# Patient Record
Sex: MALE | Race: BLACK OR AFRICAN AMERICAN | NOT HISPANIC OR LATINO | RURAL
[De-identification: names, ages, dates, MRNs, and addresses within clinical notes are randomized per-mention and may not be internally consistent; named-entity substitution may affect disease eponyms.]

---

## 2021-09-27 ENCOUNTER — HOSPITAL ENCOUNTER (EMERGENCY)
Facility: HOSPITAL | Age: 56
Discharge: HOME | End: 2021-09-27
Payer: MEDICAID

## 2021-09-27 VITALS
SYSTOLIC BLOOD PRESSURE: 156 MMHG | WEIGHT: 217 LBS | HEIGHT: 71 IN | BODY MASS INDEX: 30.38 KG/M2 | DIASTOLIC BLOOD PRESSURE: 96 MMHG | HEART RATE: 82 BPM | TEMPERATURE: 99 F | OXYGEN SATURATION: 98 % | RESPIRATION RATE: 18 BRPM

## 2021-09-27 DIAGNOSIS — S62.320A CLOSED DISPLACED FRACTURE OF SHAFT OF SECOND METACARPAL BONE OF RIGHT HAND, INITIAL ENCOUNTER: Primary | ICD-10-CM

## 2021-09-27 DIAGNOSIS — S69.90XA HAND INJURY: ICD-10-CM

## 2021-09-27 PROCEDURE — 99283 PR EMERGENCY DEPT VISIT,LEVEL III: ICD-10-PCS | Mod: ,,, | Performed by: NURSE PRACTITIONER

## 2021-09-27 PROCEDURE — 99283 EMERGENCY DEPT VISIT LOW MDM: CPT

## 2021-09-27 PROCEDURE — 99283 EMERGENCY DEPT VISIT LOW MDM: CPT | Mod: ,,, | Performed by: NURSE PRACTITIONER

## 2021-09-29 ENCOUNTER — TELEPHONE (OUTPATIENT)
Dept: EMERGENCY MEDICINE | Facility: HOSPITAL | Age: 56
End: 2021-09-29

## 2021-09-30 ENCOUNTER — TELEPHONE (OUTPATIENT)
Dept: EMERGENCY MEDICINE | Facility: HOSPITAL | Age: 56
End: 2021-09-30

## 2021-10-06 ENCOUNTER — OFFICE VISIT (OUTPATIENT)
Dept: ORTHOPEDICS | Facility: CLINIC | Age: 56
End: 2021-10-06
Payer: MEDICAID

## 2021-10-06 VITALS — HEIGHT: 71 IN | BODY MASS INDEX: 30.38 KG/M2 | WEIGHT: 217 LBS

## 2021-10-06 DIAGNOSIS — S62.341A CLOSED NONDISPLACED FRACTURE OF BASE OF SECOND METACARPAL BONE OF LEFT HAND, INITIAL ENCOUNTER: Primary | ICD-10-CM

## 2021-10-06 PROCEDURE — 99203 OFFICE O/P NEW LOW 30 MIN: CPT | Mod: 57,,, | Performed by: NURSE PRACTITIONER

## 2021-10-06 PROCEDURE — 99203 PR OFFICE/OUTPT VISIT, NEW, LEVL III, 30-44 MIN: ICD-10-PCS | Mod: 57,,, | Performed by: NURSE PRACTITIONER

## 2021-10-06 PROCEDURE — 26600 PR CLOSED RX METACARPAL FX: ICD-10-PCS | Mod: LT,,, | Performed by: NURSE PRACTITIONER

## 2021-10-06 PROCEDURE — 26600 TREAT METACARPAL FRACTURE: CPT | Mod: LT,,, | Performed by: NURSE PRACTITIONER

## 2021-10-13 DIAGNOSIS — M79.642 LEFT HAND PAIN: Primary | ICD-10-CM

## 2021-10-13 DIAGNOSIS — Z47.89 ORTHOPEDIC AFTERCARE: ICD-10-CM

## 2021-10-14 ENCOUNTER — HOSPITAL ENCOUNTER (OUTPATIENT)
Dept: RADIOLOGY | Facility: HOSPITAL | Age: 56
Discharge: HOME | End: 2021-10-14
Attending: ORTHOPAEDIC SURGERY
Payer: MEDICAID

## 2021-10-14 ENCOUNTER — OFFICE VISIT (OUTPATIENT)
Dept: ORTHOPEDICS | Facility: CLINIC | Age: 56
End: 2021-10-14
Payer: MEDICAID

## 2021-10-14 DIAGNOSIS — S62.301D CLOSED NONDISPLACED FRACTURE OF SECOND METACARPAL BONE OF LEFT HAND WITH ROUTINE HEALING, UNSPECIFIED PORTION OF METACARPAL, SUBSEQUENT ENCOUNTER: Primary | ICD-10-CM

## 2021-10-14 DIAGNOSIS — M79.642 LEFT HAND PAIN: ICD-10-CM

## 2021-10-14 DIAGNOSIS — Z47.89 ORTHOPEDIC AFTERCARE: ICD-10-CM

## 2021-10-14 PROCEDURE — 99024 PR POST-OP FOLLOW-UP VISIT: ICD-10-PCS | Mod: ,,, | Performed by: ORTHOPAEDIC SURGERY

## 2021-10-14 PROCEDURE — 99024 POSTOP FOLLOW-UP VISIT: CPT | Mod: ,,, | Performed by: ORTHOPAEDIC SURGERY

## 2021-10-14 PROCEDURE — 73130 X-RAY EXAM OF HAND: CPT | Mod: TC,LT

## 2021-10-14 PROCEDURE — 73130 XR HAND COMPLETE 3 VIEW LEFT: ICD-10-PCS | Mod: 26,LT,, | Performed by: ORTHOPAEDIC SURGERY

## 2021-10-14 PROCEDURE — 73130 X-RAY EXAM OF HAND: CPT | Mod: 26,LT,, | Performed by: ORTHOPAEDIC SURGERY

## 2021-10-25 DIAGNOSIS — M79.642 LEFT HAND PAIN: Primary | ICD-10-CM

## 2021-10-28 ENCOUNTER — HOSPITAL ENCOUNTER (OUTPATIENT)
Dept: RADIOLOGY | Facility: HOSPITAL | Age: 56
Discharge: HOME | End: 2021-10-28
Attending: ORTHOPAEDIC SURGERY
Payer: MEDICAID

## 2021-10-28 ENCOUNTER — OFFICE VISIT (OUTPATIENT)
Dept: ORTHOPEDICS | Facility: CLINIC | Age: 56
End: 2021-10-28
Payer: MEDICAID

## 2021-10-28 DIAGNOSIS — S62.301D CLOSED NONDISPLACED FRACTURE OF SECOND METACARPAL BONE OF LEFT HAND WITH ROUTINE HEALING, UNSPECIFIED PORTION OF METACARPAL, SUBSEQUENT ENCOUNTER: Primary | ICD-10-CM

## 2021-10-28 DIAGNOSIS — M79.642 LEFT HAND PAIN: ICD-10-CM

## 2021-10-28 PROCEDURE — 99024 POSTOP FOLLOW-UP VISIT: CPT | Mod: ,,, | Performed by: ORTHOPAEDIC SURGERY

## 2021-10-28 PROCEDURE — 99024 PR POST-OP FOLLOW-UP VISIT: ICD-10-PCS | Mod: ,,, | Performed by: ORTHOPAEDIC SURGERY

## 2021-10-28 PROCEDURE — 73130 XR HAND COMPLETE 3 VIEW LEFT: ICD-10-PCS | Mod: 26,LT,, | Performed by: ORTHOPAEDIC SURGERY

## 2021-10-28 PROCEDURE — 73130 X-RAY EXAM OF HAND: CPT | Mod: 26,LT,, | Performed by: ORTHOPAEDIC SURGERY

## 2021-10-28 PROCEDURE — 73130 X-RAY EXAM OF HAND: CPT | Mod: TC,LT

## 2021-11-22 DIAGNOSIS — S62.301D CLOSED NONDISPLACED FRACTURE OF SECOND METACARPAL BONE OF LEFT HAND WITH ROUTINE HEALING, UNSPECIFIED PORTION OF METACARPAL, SUBSEQUENT ENCOUNTER: Primary | ICD-10-CM

## 2022-09-29 ENCOUNTER — HOSPITAL ENCOUNTER (EMERGENCY)
Facility: HOSPITAL | Age: 57
Discharge: HOME | End: 2022-09-29
Payer: MEDICAID

## 2022-09-29 VITALS
HEART RATE: 78 BPM | RESPIRATION RATE: 17 BRPM | WEIGHT: 218 LBS | BODY MASS INDEX: 30.52 KG/M2 | HEIGHT: 71 IN | DIASTOLIC BLOOD PRESSURE: 89 MMHG | SYSTOLIC BLOOD PRESSURE: 148 MMHG | TEMPERATURE: 98 F | OXYGEN SATURATION: 99 %

## 2022-09-29 DIAGNOSIS — S39.012A BACK STRAIN, INITIAL ENCOUNTER: Primary | ICD-10-CM

## 2022-09-29 PROCEDURE — 99283 EMERGENCY DEPT VISIT LOW MDM: CPT | Mod: ,,, | Performed by: FAMILY MEDICINE

## 2022-09-29 PROCEDURE — 63600175 PHARM REV CODE 636 W HCPCS: Performed by: FAMILY MEDICINE

## 2022-09-29 PROCEDURE — 99284 EMERGENCY DEPT VISIT MOD MDM: CPT

## 2022-09-29 PROCEDURE — 99283 PR EMERGENCY DEPT VISIT,LEVEL III: ICD-10-PCS | Mod: ,,, | Performed by: FAMILY MEDICINE

## 2022-09-29 PROCEDURE — 96372 THER/PROPH/DIAG INJ SC/IM: CPT

## 2022-09-29 RX ORDER — NAPROXEN 500 MG/1
500 TABLET ORAL 2 TIMES DAILY WITH MEALS
Qty: 60 TABLET | Refills: 0 | Status: SHIPPED | OUTPATIENT
Start: 2022-09-29

## 2022-09-29 RX ORDER — KETOROLAC TROMETHAMINE 30 MG/ML
60 INJECTION, SOLUTION INTRAMUSCULAR; INTRAVENOUS
Status: COMPLETED | OUTPATIENT
Start: 2022-09-29 | End: 2022-09-29

## 2022-09-29 RX ORDER — NAPROXEN 500 MG/1
500 TABLET ORAL 2 TIMES DAILY WITH MEALS
Qty: 60 TABLET | Refills: 0 | OUTPATIENT
Start: 2022-09-29 | End: 2022-09-29 | Stop reason: SDUPTHER

## 2022-09-29 RX ORDER — DEXAMETHASONE SODIUM PHOSPHATE 4 MG/ML
4 INJECTION, SOLUTION INTRA-ARTICULAR; INTRALESIONAL; INTRAMUSCULAR; INTRAVENOUS; SOFT TISSUE
Status: COMPLETED | OUTPATIENT
Start: 2022-09-29 | End: 2022-09-29

## 2022-09-29 RX ORDER — TIZANIDINE 4 MG/1
4 TABLET ORAL EVERY 6 HOURS PRN
Qty: 30 TABLET | Refills: 0 | OUTPATIENT
Start: 2022-09-29 | End: 2022-09-29 | Stop reason: SDUPTHER

## 2022-09-29 RX ORDER — TIZANIDINE 4 MG/1
4 TABLET ORAL EVERY 6 HOURS PRN
Qty: 30 TABLET | Refills: 0 | Status: SHIPPED | OUTPATIENT
Start: 2022-09-29 | End: 2022-10-09

## 2022-09-29 RX ADMIN — KETOROLAC TROMETHAMINE 60 MG: 30 INJECTION, SOLUTION INTRAMUSCULAR at 05:09

## 2022-09-29 RX ADMIN — DEXAMETHASONE SODIUM PHOSPHATE 4 MG: 4 INJECTION, SOLUTION INTRAMUSCULAR; INTRAVENOUS at 05:09

## 2022-09-29 NOTE — ED PROVIDER NOTES
Encounter Date: 9/29/2022       History     Chief Complaint   Patient presents with    Back Pain     Patient with lower back pain starting on left lumbar with radiation to the buttocks.  No falls no trauma    Review of patient's allergies indicates:  No Known Allergies  History reviewed. No pertinent past medical history.  History reviewed. No pertinent surgical history.  History reviewed. No pertinent family history.  Social History     Tobacco Use    Smoking status: Every Day    Smokeless tobacco: Never   Substance Use Topics    Alcohol use: Yes     Alcohol/week: 3.0 standard drinks     Types: 3 Cans of beer per week    Drug use: Never     Review of Systems   Constitutional:  Positive for fatigue. Negative for fever.   HENT: Negative.  Negative for sore throat.    Eyes: Negative.    Respiratory: Negative.  Negative for shortness of breath.    Cardiovascular: Negative.  Negative for chest pain.   Gastrointestinal: Negative.  Negative for nausea.   Endocrine: Negative.    Genitourinary: Negative.  Negative for dysuria.   Musculoskeletal: Negative.  Negative for back pain.   Skin: Negative.  Negative for rash.   Allergic/Immunologic: Negative.    Neurological: Negative.  Negative for weakness.   Hematological: Negative.  Does not bruise/bleed easily.   Psychiatric/Behavioral: Negative.       Physical Exam     Initial Vitals [09/29/22 0448]   BP Pulse Resp Temp SpO2   (!) 154/99 80 17 97.6 °F (36.4 °C) 99 %      MAP       --         Physical Exam    Constitutional: He appears well-developed and well-nourished.   HENT:   Head: Normocephalic and atraumatic.   Right Ear: External ear normal.   Left Ear: External ear normal.   Nose: Nose normal.   Mouth/Throat: Oropharynx is clear and moist.   Eyes: Conjunctivae and EOM are normal. Pupils are equal, round, and reactive to light.   Neck: Neck supple.   Normal range of motion.  Cardiovascular:  Normal rate, regular rhythm, normal heart sounds and intact distal pulses.            Pulmonary/Chest: Breath sounds normal.   Abdominal: Abdomen is soft. Bowel sounds are normal.   Genitourinary:    Prostate and penis normal.     Musculoskeletal:         General: Normal range of motion.      Cervical back: Normal range of motion and neck supple.      Comments: Decreased range of motion for spine     Neurological: He is alert and oriented to person, place, and time. He has normal strength and normal reflexes.   Skin: Skin is warm and dry.   Psychiatric: He has a normal mood and affect. His behavior is normal. Judgment and thought content normal.       Medical Screening Exam   See Full Note    ED Course   Procedures  Labs Reviewed - No data to display       Imaging Results    None          Medications   ketorolac injection 60 mg (has no administration in time range)   dexamethasone injection 4 mg (has no administration in time range)                       Clinical Impression:   Final diagnoses:  [S39.012A] Back strain, initial encounter (Primary)      ED Disposition Condition    Discharge Stable          ED Prescriptions       Medication Sig Dispense Start Date End Date Auth. Provider    tiZANidine (ZANAFLEX) 4 MG tablet  (Status: Discontinued) Take 1 tablet (4 mg total) by mouth every 6 (six) hours as needed. 30 tablet 9/29/2022 9/29/2022 Julio Cesar Hewitt,     naproxen (NAPROSYN) 500 MG tablet  (Status: Discontinued) Take 1 tablet (500 mg total) by mouth 2 (two) times daily with meals. 60 tablet 9/29/2022 9/29/2022 Julio Cesar Hewitt DO    naproxen (NAPROSYN) 500 MG tablet Take 1 tablet (500 mg total) by mouth 2 (two) times daily with meals. 60 tablet 9/29/2022 -- Julio Cesar Hewitt DO    tiZANidine (ZANAFLEX) 4 MG tablet Take 1 tablet (4 mg total) by mouth every 6 (six) hours as needed. 30 tablet 9/29/2022 10/9/2022 Julio Cesar Hewitt DO          Follow-up Information    None          Julio Cesar Hewitt DO  09/29/22 0551

## 2022-09-29 NOTE — ED TRIAGE NOTES
Pt presents to ED with c/o lower back pain to left side of back. Pt states it was hurting a little this afternoon, but was severe upon waking this am. Pt states he took 500mg of tylenol approx 1.5 hrs PTA.

## 2022-09-29 NOTE — ED NOTES
Instructed patient that he may take tylenol 650-1000 mg every 4-6 hours as needed for pain. May also take ibuprofen 400-600 mg every 8 hours as needed for pain. Do not take any ibuprofen for 12-12 hours due to the toradol injection he received here at the ED. May apply ice packs to painful area for 20 minutes every 2 hours for 24 hours, rthen may warm compresses to the painful area for 20 minutes every 2 hours as needed. Follow-up with pcp on Friday if pain no better. Return to ed for any new emergencies. Patient verbalized understanding of all instructions.

## 2023-10-26 ENCOUNTER — TELEPHONE (OUTPATIENT)
Dept: FAMILY MEDICINE | Facility: CLINIC | Age: 58
End: 2023-10-26
Payer: MEDICAID

## 2025-03-19 ENCOUNTER — HOSPITAL ENCOUNTER (EMERGENCY)
Facility: HOSPITAL | Age: 60
Discharge: ANOTHER HOSPITAL | End: 2025-03-20
Payer: MEDICAID

## 2025-03-19 DIAGNOSIS — K86.89 PANCREATIC MASS: Primary | ICD-10-CM

## 2025-03-19 DIAGNOSIS — R16.0 LIVER MASSES: ICD-10-CM

## 2025-03-19 DIAGNOSIS — C79.9 METASTATIC DISEASE: ICD-10-CM

## 2025-03-19 LAB
ALBUMIN SERPL BCP-MCNC: 3.9 G/DL (ref 3.5–5)
ALBUMIN/GLOB SERPL: 0.9 {RATIO}
ALP SERPL-CCNC: 285 U/L (ref 40–150)
ALT SERPL W P-5'-P-CCNC: 72 U/L
ANION GAP SERPL CALCULATED.3IONS-SCNC: 14 MMOL/L (ref 7–16)
AST SERPL W P-5'-P-CCNC: 51 U/L (ref 11–45)
BACTERIA #/AREA URNS HPF: ABNORMAL /HPF
BASOPHILS # BLD AUTO: 0.02 K/UL (ref 0–0.2)
BASOPHILS NFR BLD AUTO: 0.3 % (ref 0–1)
BILIRUB SERPL-MCNC: 0.6 MG/DL
BILIRUB UR QL STRIP: NEGATIVE
BUN SERPL-MCNC: 12 MG/DL (ref 8–26)
BUN/CREAT SERPL: 14 (ref 6–20)
CALCIUM SERPL-MCNC: 10.2 MG/DL (ref 8.4–10.2)
CAOX CRY #/AREA URNS LPF: ABNORMAL /LPF
CHLORIDE SERPL-SCNC: 103 MMOL/L (ref 98–107)
CLARITY UR: CLEAR
CO2 SERPL-SCNC: 28 MMOL/L (ref 22–29)
COLOR UR: YELLOW
CREAT SERPL-MCNC: 0.84 MG/DL (ref 0.72–1.25)
DIFFERENTIAL METHOD BLD: ABNORMAL
EGFR (NO RACE VARIABLE) (RUSH/TITUS): 101 ML/MIN/1.73M2
EOSINOPHIL # BLD AUTO: 0.09 K/UL (ref 0–0.5)
EOSINOPHIL NFR BLD AUTO: 1.2 % (ref 1–4)
ERYTHROCYTE [DISTWIDTH] IN BLOOD BY AUTOMATED COUNT: 14.2 % (ref 11.5–14.5)
GLOBULIN SER-MCNC: 4.4 G/DL (ref 2–4)
GLUCOSE SERPL-MCNC: 166 MG/DL (ref 74–100)
GLUCOSE UR STRIP-MCNC: NEGATIVE MG/DL
HCT VFR BLD AUTO: 36.6 % (ref 40–54)
HGB BLD-MCNC: 12.2 G/DL (ref 13.5–18)
HYALINE CASTS #/AREA URNS LPF: ABNORMAL /LPF
KETONES UR STRIP-SCNC: NEGATIVE MG/DL
LEUKOCYTE ESTERASE UR QL STRIP: ABNORMAL
LIPASE SERPL-CCNC: 70 U/L
LYMPHOCYTES # BLD AUTO: 1.95 K/UL (ref 1–4.8)
LYMPHOCYTES NFR BLD AUTO: 26.2 % (ref 27–41)
MCH RBC QN AUTO: 28.1 PG (ref 27–31)
MCHC RBC AUTO-ENTMCNC: 33.3 G/DL (ref 32–36)
MCV RBC AUTO: 84.3 FL (ref 80–96)
MONOCYTES # BLD AUTO: 0.65 K/UL (ref 0–0.8)
MONOCYTES NFR BLD AUTO: 8.7 % (ref 2–6)
MPC BLD CALC-MCNC: 9.5 FL (ref 9.4–12.4)
MUCOUS THREADS #/AREA URNS HPF: ABNORMAL /HPF
NEUTROPHILS # BLD AUTO: 4.73 K/UL (ref 1.8–7.7)
NEUTROPHILS NFR BLD AUTO: 63.6 % (ref 53–65)
NITRITE UR QL STRIP: NEGATIVE
PH UR STRIP: 6.5 PH UNITS
PLATELET # BLD AUTO: 199 K/UL (ref 150–400)
POTASSIUM SERPL-SCNC: 4 MMOL/L (ref 3.5–5.1)
PROT SERPL-MCNC: 8.3 G/DL (ref 6.4–8.3)
PROT UR QL STRIP: NEGATIVE
RBC # BLD AUTO: 4.34 M/UL (ref 4.6–6.2)
RBC # UR STRIP: NEGATIVE /UL
RBC #/AREA URNS HPF: ABNORMAL /HPF
SODIUM SERPL-SCNC: 141 MMOL/L (ref 136–145)
SP GR UR STRIP: 1.01
SQUAMOUS #/AREA URNS LPF: ABNORMAL /LPF
UROBILINOGEN UR STRIP-ACNC: 1 MG/DL
WBC # BLD AUTO: 7.44 K/UL (ref 4.5–11)
WBC #/AREA URNS HPF: ABNORMAL /HPF
YEAST #/AREA URNS HPF: ABNORMAL /HPF

## 2025-03-19 PROCEDURE — 83690 ASSAY OF LIPASE: CPT | Performed by: NURSE PRACTITIONER

## 2025-03-19 PROCEDURE — 63600175 PHARM REV CODE 636 W HCPCS: Performed by: NURSE PRACTITIONER

## 2025-03-19 PROCEDURE — 25000003 PHARM REV CODE 250: Performed by: NURSE PRACTITIONER

## 2025-03-19 PROCEDURE — 96361 HYDRATE IV INFUSION ADD-ON: CPT

## 2025-03-19 PROCEDURE — 99285 EMERGENCY DEPT VISIT HI MDM: CPT | Mod: 25

## 2025-03-19 PROCEDURE — 96375 TX/PRO/DX INJ NEW DRUG ADDON: CPT

## 2025-03-19 PROCEDURE — 80053 COMPREHEN METABOLIC PANEL: CPT | Performed by: NURSE PRACTITIONER

## 2025-03-19 PROCEDURE — 81003 URINALYSIS AUTO W/O SCOPE: CPT | Performed by: NURSE PRACTITIONER

## 2025-03-19 PROCEDURE — 85025 COMPLETE CBC W/AUTO DIFF WBC: CPT | Performed by: NURSE PRACTITIONER

## 2025-03-19 PROCEDURE — 96374 THER/PROPH/DIAG INJ IV PUSH: CPT

## 2025-03-19 PROCEDURE — 99285 EMERGENCY DEPT VISIT HI MDM: CPT | Mod: ,,, | Performed by: NURSE PRACTITIONER

## 2025-03-19 PROCEDURE — 25500020 PHARM REV CODE 255: Performed by: NURSE PRACTITIONER

## 2025-03-19 PROCEDURE — 87086 URINE CULTURE/COLONY COUNT: CPT | Performed by: NURSE PRACTITIONER

## 2025-03-19 RX ORDER — HYDROMORPHONE HYDROCHLORIDE 2 MG/ML
0.5 INJECTION, SOLUTION INTRAMUSCULAR; INTRAVENOUS; SUBCUTANEOUS
Status: DISCONTINUED | OUTPATIENT
Start: 2025-03-19 | End: 2025-03-20 | Stop reason: HOSPADM

## 2025-03-19 RX ORDER — MORPHINE SULFATE 2 MG/ML
2 INJECTION, SOLUTION INTRAMUSCULAR; INTRAVENOUS
Refills: 0 | Status: COMPLETED | OUTPATIENT
Start: 2025-03-19 | End: 2025-03-19

## 2025-03-19 RX ORDER — ONDANSETRON HYDROCHLORIDE 2 MG/ML
4 INJECTION, SOLUTION INTRAVENOUS
Status: DISCONTINUED | OUTPATIENT
Start: 2025-03-19 | End: 2025-03-19

## 2025-03-19 RX ORDER — OXYCODONE HYDROCHLORIDE 5 MG/1
5 TABLET ORAL EVERY 4 HOURS PRN
Refills: 0 | Status: DISCONTINUED | OUTPATIENT
Start: 2025-03-19 | End: 2025-03-19

## 2025-03-19 RX ORDER — ALUMINUM HYDROXIDE, MAGNESIUM HYDROXIDE, AND SIMETHICONE 1200; 120; 1200 MG/30ML; MG/30ML; MG/30ML
30 SUSPENSION ORAL
Status: COMPLETED | OUTPATIENT
Start: 2025-03-19 | End: 2025-03-19

## 2025-03-19 RX ORDER — IOPAMIDOL 755 MG/ML
100 INJECTION, SOLUTION INTRAVASCULAR
Status: COMPLETED | OUTPATIENT
Start: 2025-03-19 | End: 2025-03-19

## 2025-03-19 RX ORDER — OXYCODONE HYDROCHLORIDE 5 MG/1
5 TABLET ORAL
Refills: 0 | Status: DISCONTINUED | OUTPATIENT
Start: 2025-03-19 | End: 2025-03-19

## 2025-03-19 RX ORDER — ONDANSETRON HYDROCHLORIDE 2 MG/ML
4 INJECTION, SOLUTION INTRAVENOUS EVERY 6 HOURS PRN
Status: DISCONTINUED | OUTPATIENT
Start: 2025-03-19 | End: 2025-03-20 | Stop reason: HOSPADM

## 2025-03-19 RX ADMIN — HYDROMORPHONE HYDROCHLORIDE 0.5 MG: 2 INJECTION INTRAMUSCULAR; INTRAVENOUS; SUBCUTANEOUS at 11:03

## 2025-03-19 RX ADMIN — ONDANSETRON 4 MG: 2 INJECTION INTRAMUSCULAR; INTRAVENOUS at 11:03

## 2025-03-19 RX ADMIN — ALUMINUM HYDROXIDE, MAGNESIUM HYDROXIDE, AND SIMETHICONE 30 ML: 1200; 120; 1200 SUSPENSION ORAL at 06:03

## 2025-03-19 RX ADMIN — SODIUM CHLORIDE 1000 ML: 9 INJECTION, SOLUTION INTRAVENOUS at 07:03

## 2025-03-19 RX ADMIN — MORPHINE SULFATE 2 MG: 2 INJECTION, SOLUTION INTRAMUSCULAR; INTRAVENOUS at 09:03

## 2025-03-19 RX ADMIN — IOPAMIDOL 100 ML: 755 INJECTION, SOLUTION INTRAVENOUS at 07:03

## 2025-03-19 NOTE — ED TRIAGE NOTES
C/o mid back pain that started approx 4 months ago. Denies any injury. States his pain worsens after eating and upon further investigation has upper abd pain after he eats that radiates into mid back.

## 2025-03-20 VITALS
OXYGEN SATURATION: 100 % | DIASTOLIC BLOOD PRESSURE: 86 MMHG | BODY MASS INDEX: 30.52 KG/M2 | RESPIRATION RATE: 18 BRPM | SYSTOLIC BLOOD PRESSURE: 142 MMHG | HEIGHT: 71 IN | HEART RATE: 78 BPM | TEMPERATURE: 99 F | WEIGHT: 218 LBS

## 2025-03-20 NOTE — ED PROVIDER NOTES
Encounter Date: 3/19/2025       History     Chief Complaint   Patient presents with    Back Pain     Patient presents with epigastric and left upper quadrant pain that radiates to the midthoracic spine x4 months, worsening since yesterday.  No fever, nausea, vomiting, or diarrhea.  Normal bowel pattern.  Reports pain is often worse after he eats.  Used to be a heavy drinker, now drinks 4-6 drinks 2 to 3 times a week.  Smokes 10 cigarettes a day times 30 years, 15 pack year history.  Has no known medical history, but has also not been under any type of care in the past years.      Review of patient's allergies indicates:  No Known Allergies  History reviewed. No pertinent past medical history.  History reviewed. No pertinent surgical history.  No family history on file.  Social History[1]  Review of Systems   Constitutional:  Negative for fever.   Respiratory: Negative.     Cardiovascular: Negative.    Gastrointestinal:  Positive for abdominal pain. Negative for blood in stool, constipation, diarrhea, nausea and vomiting.   Genitourinary:  Negative for dysuria.   Musculoskeletal:  Positive for back pain.   Neurological: Negative.        Physical Exam     Initial Vitals [03/19/25 1740]   BP Pulse Resp Temp SpO2   (!) 148/88 92 18 98.5 °F (36.9 °C) 98 %      MAP       --         Physical Exam    Nursing note and vitals reviewed.  Constitutional: No distress.   HENT:   Head: Normocephalic and atraumatic.   Eyes: EOM are normal.   Neck: Neck supple.   Cardiovascular:  Normal rate, regular rhythm and normal heart sounds.           Pulmonary/Chest: Breath sounds normal. No respiratory distress.   Abdominal: Abdomen is soft. He exhibits no distension and no mass. There is abdominal tenderness (mild epigastric/LUQ).   Musculoskeletal:         General: Normal range of motion.      Cervical back: Neck supple.     Neurological: He is alert. GCS score is 15. GCS eye subscore is 4. GCS verbal subscore is 5. GCS motor subscore is  6.   Skin: Skin is warm and dry. Capillary refill takes less than 2 seconds.         Medical Screening Exam   See Full Note    ED Course   Procedures  Labs Reviewed   COMPREHENSIVE METABOLIC PANEL - Abnormal       Result Value    Sodium 141      Potassium 4.0      Chloride 103      CO2 28      Anion Gap 14      Glucose 166 (*)     BUN 12      Creatinine 0.84      BUN/Creatinine Ratio 14      Calcium 10.2      Total Protein 8.3      Albumin 3.9      Globulin 4.4 (*)     A/G Ratio 0.9      Bilirubin, Total 0.6      Alk Phos 285 (*)     ALT 72 (*)     AST 51 (*)     eGFR 101     LIPASE - Abnormal    Lipase 70 (*)    CBC WITH DIFFERENTIAL - Abnormal    WBC 7.44      RBC 4.34 (*)     Hemoglobin 12.2 (*)     Hematocrit 36.6 (*)     MCV 84.3      MCH 28.1      MCHC 33.3      RDW 14.2      Platelet Count 199      MPV 9.5      Neutrophils % 63.6      Lymphocytes % 26.2 (*)     Neutrophils, Abs 4.73      Lymphocytes, Absolute 1.95      Diff Type Auto      Monocytes % 8.7 (*)     Eosinophils % 1.2      Basophils % 0.3      Monocytes, Absolute 0.65      Eosinophils, Absolute 0.09      Basophils, Absolute 0.02     URINALYSIS, REFLEX TO URINE CULTURE - Abnormal    Color, UA Yellow      Clarity, UA Clear      pH, UA 6.5      Leukocytes, UA Trace (*)     Nitrites, UA Negative      Protein, UA Negative      Glucose, UA Negative      Ketones, UA Negative      Urobilinogen, UA 1.0      Bilirubin, UA Negative      Blood, UA Negative      Specific [place], UA 1.015     URINALYSIS, MICROSCOPIC - Abnormal    WBC, UA 11-15 (*)     RBC, UA 3-5 (*)     Bacteria, UA Few (*)     Yeast, UA Few (*)     Squamous Epithelial Cells, UA Moderate (*)     Mucus, UA Few (*)     Hyaline Casts, UA 0-2 (*)     Calcium Oxalate Crystals, UA Few (*)     Narrative:     Few clue cells   CULTURE, URINE   CBC W/ AUTO DIFFERENTIAL    Narrative:     The following orders were created for panel order CBC auto differential.  Procedure                                Abnormality         Status                     ---------                               -----------         ------                     CBC with Differential[[id number]]        Abnormal            Final result                 Please view results for these tests on the individual orders.          Imaging Results              X-Ray Chest PA And Lateral (Final result)  Result time 03/20/25 08:12:49      Final result by Brandy Amos MD (03/20/25 08:12:49)                   Impression:      No acute cardiopulmonary abnormality.      Electronically signed by: Brandy Amos  Date:    03/20/2025  Time:    08:12               Narrative:    EXAMINATION:  XR CHEST PA AND LATERAL    CLINICAL HISTORY:  Secondary malignant neoplasm of unspecified site    TECHNIQUE:  PA and lateral views of the chest were performed.    COMPARISON:  None    FINDINGS:  The lungs are clear.  No focal consolidation, pleural effusion or pneumothorax.    Normal cardiomediastinal contour.    No acute or aggressive osseous abnormality.                                        CT Abdomen Pelvis With IV Contrast NO Oral Contrast (Final result)  Result time 03/19/25 19:45:25      Final result by Fernando Ozuna MD (03/19/25 19:45:25)                   Impression:      1. Large 7.6 x 5.4 cm mass involving the pancreatic tail suggesting neoplasm.  The mass abuts and may invade the adjacent stomach.  Follow-up recommended.  2. Multiple masses throughout the liver suggesting metastatic disease.  The largest measures 4.4 cm.  Recommend follow-up.  3. There is an additional small nonspecific subcentimeter hypodense lesion in the pancreatic head.  Recommend follow-up.  4. Small umbilical hernia with minimal protrusion of small bowel.  No evidence of obstruction.  5. Mild prostatomegaly.  6. Possible constipation.  7. Degenerative changes of the lumbar spine primarily at L5-S1.  8. This report was flagged in Epic as abnormal.      Electronically signed by: Fernando  Froylan  Date:    03/19/2025  Time:    19:45               Narrative:    EXAMINATION:  CT ABDOMEN PELVIS WITH IV CONTRAST    CLINICAL HISTORY:  Epigastric pain;hx alcoholism, epigastric/LUQ tenderness, elevated hepatic transaminases;    TECHNIQUE:  Low dose axial images, sagittal and coronal reformations were obtained from the lung bases to the pubic symphysis following the IV administration of 100 mL of Omnipaque 350 .  Oral contrast was not administered.    COMPARISON:  None.    FINDINGS:  Abdomen:    - Lower thorax:    - Lung bases: No infiltrates and no nodules.    - Liver: Multiple masses throughout the liver suspicious for metastatic disease.  The largest measures 4.4 cm on axial 24.    - Gallbladder: No calcified gallstones.    - Bile Ducts: No evidence of intra or extra hepatic biliary ductal dilation.    - Spleen: Negative.    - Kidneys: No stone, mass or hydronephrosis.    - Adrenals: Unremarkable.    - Pancreas: 7.6 x 5.4 cm heterogeneous hypoechoic mass of the pancreatic tail suggesting neoplasm.  This abuts and may invade the adjacent stomach on coronal 32 also.  Follow-up recommended.    Small subcentimeter hypodense focus in the pancreatic head also.    Calcifications are noted at the margin of the pancreatic head also.    - Retroperitoneum:  No significant adenopathy.    - Vascular: No abdominal aortic aneurysm.    - Abdominal wall:  Small umbilical hernia with minimal protrusion of small bowel.    Pelvis:    Urinary bladder is within normal limits.    Prostate measures 5.6 cm.    The appendix is within normal limits.    Moderate retained feces in the colon.    Bowel/Mesentery:    No evidence of bowel obstruction or inflammation.    Bones:  No acute fractures.    Severe disc space narrowing at L5-S1.  Severe bilateral foraminal narrowing.  Mild diffuse posterior disc osteophyte complex.                                       Medications   aluminum-magnesium hydroxide-simethicone 200-200-20 mg/5 mL  suspension 30 mL (30 mLs Oral Given 3/19/25 1838)   sodium chloride 0.9% bolus 1,000 mL 1,000 mL (0 mLs Intravenous Stopped 3/19/25 2050)   iopamidoL (ISOVUE-370) injection 100 mL (100 mLs Intravenous Given 3/19/25 1918)   morphine injection 2 mg (2 mg Intravenous Given 3/19/25 2127)     Medical Decision Making  The patient overall does not appear acutely ill and has no known medical history.  However risk factors include alcohol consumption and tobacco use, longstanding history.  Pain onset has been of insidious onset over the past 4 months.  Also reports a noticeable weight loss, but uncertain how much during that timeframe.  Slight elevation in lipase, as well as elevated hepatic transaminases, ALP being most significant.  CT shows multiple masses of the liver, as well as a mass of the pancreatic tail that may involve the stomach.  Plan to consult [facility] and ultimately transfer for appropriate consultation with gastroenterology, interventional radiology and likely oncology.    Video consult with Dr. Jain at [facility].  Agrees with plan to transfer patient for proper workup.    Multiple beds/ services unavailable in the region. Accepted by Dr. Retana at [facility] in [place], [place]. Will possibly be AM before bed available.    Amount and/or Complexity of Data Reviewed  Labs: ordered.  Radiology: ordered.    Risk  OTC drugs.  Prescription drug management.               ED Course as of 03/21/25 0705   Thu Mar 20, 2025   0938 Patient assigned bed 1219 at [facility] in [place] and awaiting EMS transportation. Patient updated on status and agrees with POC. [KT]      ED Course User Index  [KT] Mary Alegre NP                           Clinical Impression:   Final diagnoses:  [C79.9] Metastatic disease  [R16.0] Liver masses  [K86.89] Pancreatic mass (Primary)        ED Disposition Condition    Transfer to Another Facility Stable                  Willie Salazar FNP  03/19/25 2226         Willie Salazar,  [facility]  03/19/25 2256       [1]   Social History  Tobacco Use    Smoking status: Every Day     Types: Cigarettes    Smokeless tobacco: Never   Substance Use Topics    Alcohol use: Yes     Alcohol/week: 3.0 standard drinks of alcohol     Types: 3 Cans of beer per week     Comment: 3 x per week    Drug use: Never        Willie Salazar, [facility]  03/21/25 0780

## 2025-03-20 NOTE — ED NOTES
Report received. Patient requesting that if at all possible he be transferred to a closer facility. Informed him that we would contact [facility] and see if anything was available.

## 2025-03-20 NOTE — ED NOTES
Kenia, with [facility] called for an update on Mr. Masters.  Informed her that his vital signs are stable, BP-139/85, P-71, B2Vlt-99% on room air.  Patient received Pain medication just before midnight & has been sleeping quietly ever since.

## 2025-03-22 LAB — UA COMPLETE W REFLEX CULTURE PNL UR: NORMAL

## 2025-03-25 ENCOUNTER — TELEPHONE (OUTPATIENT)
Dept: EMERGENCY MEDICINE | Facility: HOSPITAL | Age: 60
End: 2025-03-25
Payer: MEDICAID

## 2025-03-25 NOTE — TELEPHONE ENCOUNTER
Courtesy call to this patient to check and see how he was doing. States he is doing good at this time. He is waiting on the results of the biopsy and is about to head home.

## 2025-05-31 ENCOUNTER — HOSPITAL ENCOUNTER (EMERGENCY)
Facility: HOSPITAL | Age: 60
Discharge: HOME | End: 2025-05-31
Payer: MEDICAID

## 2025-05-31 VITALS
DIASTOLIC BLOOD PRESSURE: 79 MMHG | HEART RATE: 67 BPM | BODY MASS INDEX: 24.78 KG/M2 | TEMPERATURE: 98 F | SYSTOLIC BLOOD PRESSURE: 130 MMHG | WEIGHT: 177 LBS | RESPIRATION RATE: 18 BRPM | OXYGEN SATURATION: 100 % | HEIGHT: 71 IN

## 2025-05-31 DIAGNOSIS — R17 JAUNDICE: ICD-10-CM

## 2025-05-31 DIAGNOSIS — C25.9 PANCREATIC CARCINOMA METASTATIC TO LIVER: Primary | ICD-10-CM

## 2025-05-31 DIAGNOSIS — C78.7 PANCREATIC CARCINOMA METASTATIC TO LIVER: Primary | ICD-10-CM

## 2025-05-31 PROCEDURE — 99281 EMR DPT VST MAYX REQ PHY/QHP: CPT

## 2025-05-31 PROCEDURE — 99282 EMERGENCY DEPT VISIT SF MDM: CPT | Mod: ,,, | Performed by: EMERGENCY MEDICINE

## 2025-06-14 NOTE — ED PROVIDER NOTES
Encounter Date: 5/31/2025       History     Chief Complaint   Patient presents with    Jaundice     Pt states he wants to know why his eyes are yellow, pt states he noticed yesterday, pt states he has cancer in pancrease      PT IS A 59 YR OLD DX WITH METASTATIC PANCREATIC CA DX 3/19/2025 PER CT WITH BX REVEALING PANCREATIC ADENOCARCINOMA AND TODAY HAS NOTED SCLERA ARE JAUNDICED. PT HAS KNOWN LIVER METS. PT HAD PORT PLACED YESTERDAY FOR ANTICIPATED CHEMOTHERAPY. PT HAD BILIRUBIN 2.87 ON 05/19/2025  PT DENIES ADDITIONAL NEW COMPLAINTS.  PT DENIES FEVER/CHILLS,N/V/D,  PALPITATIONS OR SYNCOPE.    Past Medical History  Diagnosis Date Comments  Cancer [C80.1]    Malignant neoplasm of pancreas, unspecified [C25.9]     CT REPORT  CT 3/19/25:  1. Large 7.6 x 5.4 cm mass involving the pancreatic tail suggesting neoplasm. The mass abuts and may invade the adjacent stomach. Follow-up recommended.   2. Multiple masses throughout the liver suggesting metastatic disease. The largest measures 4.4 cm. Recommend follow-up.   3. There is an additional small nonspecific subcentimeter hypodense lesion in the pancreatic head. Recommend follow-up.   4. Small umbilical hernia with minimal protrusion of small bowel. No evidence of obstruction.   5. Mild prostatomegaly.   6. Possible constipation.   7. Degenerative changes of the lumbar spine primarily at L5-S1.   8. This report was flagged in Epic as abnormal                Review of patient's allergies indicates:  No Known Allergies  Past Medical History:   Diagnosis Date    Cancer     Malignant neoplasm of pancreas, unspecified      Past Surgical History:   Procedure Laterality Date    PLACEMENT, MEDIPORT Right      No family history on file.  Social History[1]  Review of Systems   Constitutional: Negative.  Negative for fever.   HENT:  Negative for sore throat.         ICTERIC SCLERA   Eyes: Negative.    Respiratory: Negative.  Negative for shortness of breath.    Cardiovascular:   Negative for chest pain.   Gastrointestinal: Negative.  Negative for abdominal pain, diarrhea, nausea and vomiting.   Endocrine: Negative.    Genitourinary:  Negative for dysuria.   Musculoskeletal: Negative.  Negative for back pain.   Skin:  Positive for color change (ICTERIC SCLERA). Negative for rash.   Allergic/Immunologic: Positive for immunocompromised state (CANCER DIAGNOSIS).   Neurological: Negative.  Negative for weakness.   Hematological:  Does not bruise/bleed easily.   Psychiatric/Behavioral: Negative.     All other systems reviewed and are negative.      Physical Exam     Initial Vitals [05/31/25 1515]   BP Pulse Resp Temp SpO2   130/79 67 18 97.9 °F (36.6 °C) 100 %      MAP       --         Physical Exam    Nursing note and vitals reviewed.  Constitutional: He appears well-developed and well-nourished. He is cooperative. No distress.   HENT:   Head: Normocephalic and atraumatic.   Right Ear: External ear normal.   Left Ear: External ear normal.   Nose: Nose normal. Mouth/Throat: Oropharynx is clear and moist.   ICTERIC SCLERA   Eyes: Conjunctivae and EOM are normal. Pupils are equal, round, and reactive to light.   Neck: Trachea normal. Neck supple.   Normal range of motion.  Cardiovascular:  Normal rate, regular rhythm, normal heart sounds and intact distal pulses.           No murmur heard.  Pulses:       Radial pulses are 3+ on the right side and 3+ on the left side.        Dorsalis pedis pulses are 3+ on the right side and 3+ on the left side.   Pulmonary/Chest: Breath sounds normal.   Abdominal: Abdomen is soft. Bowel sounds are normal. He exhibits no distension. There is no abdominal tenderness.   Musculoskeletal:         General: No tenderness or edema. Normal range of motion.      Right shoulder: Normal.      Left shoulder: Normal.      Right upper arm: Normal.      Left upper arm: Normal.      Right elbow: Normal.      Left elbow: Normal.      Right forearm: Normal.      Left forearm: Normal.       Right wrist: Normal.      Left wrist: Normal.      Right hand: Normal.      Left hand: Normal.      Cervical back: Normal range of motion and neck supple.     Lymphadenopathy:     He has no cervical adenopathy.     He has no axillary adenopathy.   Neurological: He is alert and oriented to person, place, and time. He has normal strength. No cranial nerve deficit or sensory deficit. He displays a negative Romberg sign. GCS eye subscore is 4. GCS verbal subscore is 5. GCS motor subscore is 6.   Reflex Scores:       Brachioradialis reflexes are 2+ on the right side and 2+ on the left side.       Patellar reflexes are 2+ on the right side and 2+ on the left side.  Skin: Skin is warm and dry. Capillary refill takes less than 2 seconds. No erythema.   ICTERIC SCLERA   Psychiatric: He has a normal mood and affect. His speech is normal and behavior is normal. Judgment and thought content normal. Cognition and memory are normal.         Medical Screening Exam   See Full Note    ED Course   Procedures  Labs Reviewed - No data to display       Imaging Results    None          Medications - No data to display  Medical Decision Making  PT IS A 59 YR OLD DX WITH METASTATIC PANCREATIC CA DX 3/19/2025 PER CT WITH BX REVEALING PANCREATIC ADENOCARCINOMA AND TODAY HAS NOTED SCLERA ARE JAUNDICED. PT HAS KNOWN LIVER METS. PT HAD PORT PLACED YESTERDAY FOR ANTICIPATED CHEMOTHERAPY. PT HAD BILIRUBIN 2.87 ON 05/19/2025  PT DENIES ADDITIONAL NEW COMPLAINTS.  PT DENIES FEVER/CHILLS,N/V/D,  PALPITATIONS OR SYNCOP    Amount and/or Complexity of Data Reviewed  Discussion of management or test interpretation with external provider(s): EXAM   REVIEW OF MEDICAL RECORDS WITH PT NOTING METASTATIC TO LIVER PANCREATIC ADENOCARCINOMA  ADVISED PT THIS ICTERIC SCLERA WOULD BE EXPECTED  PT DECLINES LAB TESTING OR IMAGING TODAY AND WILL FOLLOW UP AS PREVIOUSLY ARRANGED TO INITIATE CHEMOTHERAPY  PT DC IN STABLE CONIDTION                                         Clinical Impression:   Final diagnoses:  [R17] Jaundice  [C25.9, C78.7] Pancreatic carcinoma metastatic to liver (Primary)        ED Disposition Condition    Discharge Stable          ED Prescriptions    None       Follow-up Information       Follow up With Specialties Details Why Contact Info    ONCOLOGY  In 2 days                 [1]   Social History  Tobacco Use    Smoking status: Every Day     Types: Cigarettes    Smokeless tobacco: Never   Substance Use Topics    Alcohol use: Yes     Alcohol/week: 3.0 standard drinks of alcohol     Types: 3 Cans of beer per week     Comment: 3 x per week    Drug use: Never        Pascale Gutierrez MD  06/14/25 0203